# Patient Record
Sex: FEMALE | Race: WHITE | Employment: OTHER | URBAN - METROPOLITAN AREA
[De-identification: names, ages, dates, MRNs, and addresses within clinical notes are randomized per-mention and may not be internally consistent; named-entity substitution may affect disease eponyms.]

---

## 2019-09-05 ENCOUNTER — TRANSFERRED RECORDS (OUTPATIENT)
Dept: PHYSICAL THERAPY | Facility: CLINIC | Age: 44
End: 2019-09-05

## 2019-09-09 ENCOUNTER — THERAPY VISIT (OUTPATIENT)
Dept: PHYSICAL THERAPY | Facility: CLINIC | Age: 44
End: 2019-09-09
Payer: COMMERCIAL

## 2019-09-09 DIAGNOSIS — M25.552 BILATERAL HIP PAIN: ICD-10-CM

## 2019-09-09 DIAGNOSIS — M25.551 BILATERAL HIP PAIN: ICD-10-CM

## 2019-09-09 PROCEDURE — 97112 NEUROMUSCULAR REEDUCATION: CPT | Mod: GP | Performed by: PHYSICAL THERAPIST

## 2019-09-09 PROCEDURE — 97162 PT EVAL MOD COMPLEX 30 MIN: CPT | Mod: GP | Performed by: PHYSICAL THERAPIST

## 2019-09-09 PROCEDURE — 97110 THERAPEUTIC EXERCISES: CPT | Mod: GP | Performed by: PHYSICAL THERAPIST

## 2019-09-09 ASSESSMENT — ACTIVITIES OF DAILY LIVING (ADL)
TWISTING/PIVOTING_ON_INVOLVED_LEG: MODERATE DIFFICULTY
HOS_ADL_SCORE(%): 55
GETTING_INTO_AND_OUT_OF_A_BATHTUB: SLIGHT DIFFICULTY
RECREATIONAL_ACTIVITIES: MODERATE DIFFICULTY
HOS_ADL_COUNT: 15
SITTING_FOR_15_MINUTES: SLIGHT DIFFICULTY
DEEP_SQUATTING: UNABLE TO DO
WALKING_APPROXIMATELY_10_MINUTES: SLIGHT DIFFICULTY
STANDING_FOR_15_MINUTES: SLIGHT DIFFICULTY
GOING_DOWN_1_FLIGHT_OF_STAIRS: MODERATE DIFFICULTY
HOS_ADL_ITEM_SCORE_TOTAL: 33
LIGHT_TO_MODERATE_WORK: SLIGHT DIFFICULTY
HOS_ADL_HIGHEST_POTENTIAL_SCORE: 60
ROLLING_OVER_IN_BED: MODERATE DIFFICULTY
HEAVY_WORK: SLIGHT DIFFICULTY
WALKING_INITIALLY: EXTREME DIFFICULTY
STEPPING_UP_AND_DOWN_CURBS: MODERATE DIFFICULTY
GOING_UP_1_FLIGHT_OF_STAIRS: MODERATE DIFFICULTY
GETTING_INTO_AND_OUT_OF_AN_AVERAGE_CAR: MODERATE DIFFICULTY
WALKING_15_MINUTES_OR_GREATER: SLIGHT DIFFICULTY
HOW_WOULD_YOU_RATE_YOUR_CURRENT_LEVEL_OF_FUNCTION_DURING_YOUR_USUAL_ACTIVITIES_OF_DAILY_LIVING_FROM_0_TO_100_WITH_100_BEING_YOUR_LEVEL_OF_FUNCTION_PRIOR_TO_YOUR_HIP_PROBLEM_AND_0_BEING_THE_INABILITY_TO_PERFORM_ANY_OF_YOUR_USUAL_DAILY_ACTIVITIES?: 30

## 2019-09-09 NOTE — PROGRESS NOTES
[facility] Initial Evaluation  Subjective:    Melissa Go being seen for B hip pain.   Date of Onset: 9/2018. Problem occurred: Insidious onset R outer hip pain one month ago and L inside groin pain L at least one year ago.  and reported as 7/10 on pain scale. General health as reported by patient is fair. Pertinent medical history includes:  Anemia, asthma, depression and overweight (pre-diabetes, bipolar disorder, stress incontinence, pre menopausal, B thumb pain.  Has been seen in therapy 2016 for LB and leg pain (doesn't do much of HEP from this now), B shoulder pain).   Other medical allergies details: sulfa antibiotics, sensative to bandaids.   Other surgery history details: 2 section, collar bone.  Current medications:  Sleep medication, pain medication, anti-inflammatory and anti-depressants (anti-inflammatory for LBP but doesn't help hip pain).   Primary job tasks include:  Prolonged standing and driving.  Pain is described as aching and sharp and is intermittent. Pain is the same all the time. Since onset symptoms are gradually worsening (now R side worse too).      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 Restrictions include:  Working in normal job without restrictions.    Barriers include:  Stairs.  Red flags:  Pain at rest/night.  Type of problem:  Left hip    This is a chronic condition    Patient reports pain:  Groin (L groin, anterior hip, R lateral hip).  Associated symptoms:  Buckling/giving out, loss of motion/stiffness, loss of strength and catching. Symptoms are exacerbated by other, descending stairs, ascending stairs, bending/squatting and weight bearing (getting in/out of bed, getting in/out of car, stairs to get up to son's room sometimes one step at a time & railing, walking with toes pointed inward.  Bending to pick item from floor, crossing legs, rolling in bed, unable to have sex) Relieved by: lying down (any position)                      Objective:  Standing  Alignment:            Hip deviations alignment: shifts weight to R LE in standing and during sit to stand.        Gait:    Gait Type:  Antalgic   Weight Bearing Status:  WBAT   Assistive Devices:  None  Deviations:  General Deviations:  Toe out L               Lumbar/SI Evaluation  ROM:    AROM Lumbar:   Flexion:          No loss  Ext:                    Min loss reaches to toes stretching to B hips   Side Bend:        Left:     Right:   Rotation:           Left:     Right:   Side Glide:        Left:  No loss    Right:  No loss mild strain                                                              Hip Evaluation  HIP AROM:  : all R hip ROM in standing painful on L side due to weight bearing.  Flexion: Left: WNL    Right:  WNL     Extension: Left: tightness    Right:  Tightness  Abduction: Left:  WNL    Right:  WNL      Internal Rotation: Left: limited, painful   Right:  External Rotation: Left: 75    Right: 80        Hip Strength:    Flexion:   Left: 5/5   Pain:  Right: 4/5   Pain:                    Extension:  Left: 3/5  Pain:Right: 3/5    Pain:    Abduction:  Left: 4-/5     Pain:Right: 4/5    Pain:          Knee Extension:  Left: 5/5   Pain:Right: 5/5    Pain:        Hip Special Testing:   Special tests hip not assessed: (+) L hip scour, (-) R hip scour.  Left hip positive for the following special tests:  Shaheed; Fadir/Labrum and Russel  Left hip negative for the following special tests:  Piriformis; Distraction or SLR (Active SLR painful iniitally, passive SLR just stretching)  Right hip negative for the following special tests:  Piriformis; Shaheed; Fadir/Labrum; SLR or Russel    Hip Palpation:    Left hip tenderness present at:   hip flexors    Right hip tenderness not present at:  hip flexors  Functional Testing:            Proprioception:    Stork balance test:   Left:    3-4 seconds painful  Right:  3-4 seconds  % of Uninvolved:                  Mariola Lumbar Evaluation    Posture:  Sitting:  fair  Standing: fair    Lateral Shift: no  Correction of Posture: no effect  Other Observations: LBP with FIS and EIS no change to hip     Test Movements:  FIS: During: no effect  After: no effect  Pretest Movements: 0/10 pain  Repeat FIS: During: no effect  After: no effect    EIS: During: no effect  After: no effect    Repeat EIS: During: no effect  After: no effect  Mechanical Response: no effect            Conclusion: other (L hip potential derangement (YASIR?)  vs hip flexor strain)  Principle of Treatment:  Posture Correction: Educate keep neutral spine, (+) neural tension, use of lumbar support, slouch over-correct etc    Extension: will assess repeated hip extension next visit    Other: hip flexor stretching, hip pendulum prn for relief, progress with gluteal strengthening, potential joint mobilizations                                       ROS    Assessment/Plan:    Patient is a 43 year old female with B hip complaints.    Patient has the following significant findings with corresponding treatment plan.                Diagnosis 1:  B hip pain    Pain -  hot/cold therapy, manual therapy, self management, education and home program  Decreased ROM/flexibility - manual therapy, therapeutic exercise and home program  Decreased joint mobility - manual therapy, therapeutic exercise and home program  Decreased strength - therapeutic exercise, therapeutic activities and home program  Decreased proprioception - neuro re-education, therapeutic activities and home program  Impaired gait - gait training and home program  Decreased function - therapeutic activities and home program  Impaired posture - neuro re-education and home program    Therapy Evaluation Codes:   1) History comprised of:   Personal factors that impact the plan of care:      Overall behavior pattern and Past/current experiences.    Comorbidity factors that impact the plan of care are:      Mental illness, Pain at night/rest and Weakness.     Medications  impacting care: Anti-inflammatory and Pain.  2) Examination of Body Systems comprised of:   Body structures and functions that impact the plan of care:      Hip and Lumbar spine.   Activity limitations that impact the plan of care are:      Bending, Dressing, Lifting, Sitting, Squatting/kneeling, Stairs, Standing, Walking, Working and [place].  3) Clinical presentation characteristics are:   Evolving/Changing.  4) Decision-Making    Moderate complexity using standardized patient assessment instrument and/or measureable assessment of functional outcome.  Cumulative Therapy Evaluation is: Moderate complexity.    Previous and current functional limitations:  (See Goal Flow Sheet for this information)    Short term and Long term goals: (See Goal Flow Sheet for this information)     Communication ability:  Patient appears to be able to clearly communicate and understand verbal and written communication and follow directions correctly.  Treatment Explanation - The following has been discussed with the patient:   RX ordered/plan of care  Anticipated outcomes  Possible risks and side effects  This patient would benefit from PT intervention to resume normal activities.   Rehab potential is good.    Frequency:  1 X week, once daily  Duration:  for 8 weeks  Discharge Plan:  Achieve all LTG.  Independent in home treatment program.  Reach maximal therapeutic benefit.    Please refer to the daily flowsheet for treatment today, total treatment time and time spent performing 1:1 timed codes.

## 2019-09-09 NOTE — LETTER
[facility] PHYSICAL THERAPY  [address]  [place] [address]  [phone]    September 10, 2019    Re: Melissa Go   :   1975  MRN:  [id number]   REFERRING PHYSICIAN:   Quyen Cleveland    [facility]    Date of Initial Evaluation:  2019  Visits:  Rxs Used: 1  Reason for Referral:  Bilateral hip pain    EVALUATION SUMMARY    Subjective:  Melissa Go being seen for B hip pain.   Date of Onset: 2018. Problem occurred: Insidious onset R outer hip pain one month ago and L inside groin pain L at least one year ago.  and reported as 7/10 on pain scale. General health as reported by patient is fair. Pertinent medical history includes:  Anemia, asthma, depression and overweight (pre-diabetes, bipolar disorder, stress incontinence, pre menopausal, B thumb pain.  Has been seen in therapy 2016 for LB and leg pain (doesn't do much of HEP from this now), B shoulder pain).   Other medical allergies details: sulfa antibiotics, sensative to bandaids.   Other surgery history details: 2 section, collar bone.  Current medications:  Sleep medication, pain medication, anti-inflammatory and anti-depressants (anti-inflammatory for LBP but doesn't help hip pain).   Primary job tasks include:  Prolonged standing and driving.  Pain is described as aching and sharp and is intermittent. Pain is the same all the time. Since onset symptoms are gradually worsening (now R side worse too).      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 Restrictions include:  Working in normal job without restrictions.  Barriers include:  Stairs.  Red flags:  Pain at rest/night.  Type of problem:  Left hip  This is a chronic condition    Patient reports pain:  Groin (L groin, anterior hip, R lateral hip).  Associated symptoms:  Buckling/giving out, loss of motion/stiffness, loss of strength and catching. Symptoms are exacerbated by  other, descending stairs, ascending stairs, bending/squatting and weight bearing (getting in/out of bed, getting in/out of car, stairs to get up to son's room sometimes one step at a time & railing, walking with toes pointed inward.  Bending to pick item from floor, crossing legs, rolling in bed, unable to have sex) Relieved by: lying down (any position)                  Objective:  Standing Alignment:    Hip deviations alignment: shifts weight to R LE in standing and during sit to stand.  Gait:    Gait Type:  Antalgic   Weight Bearing Status:  WBAT   Assistive Devices:  None  Deviations:  General Deviations:  Toe out L    Re: Melissa Donavan   :   1975       Lumbar/SI Evaluation  ROM:    AROM Lumbar:   Flexion:          No loss  Ext:                    Min loss reaches to toes stretching to B hips   Side Bend:        Left:     Right:   Rotation:           Left:     Right:   Side Glide:        Left:  No loss    Right:  No loss mild strain           Hip Evaluation  HIP AROM:  : all R hip ROM in standing painful on L side due to weight bearing.  Flexion: Left: WNL    Right:  WNL   Extension: Left: tightness    Right:  Tightness  Abduction: Left:  WNL    Right:  WNL  Internal Rotation: Left: limited, painful   Right:  External Rotation: Left: 75    Right: 80   Hip Strength:    Flexion:   Left: 5/5   Pain:  Right: 4/5   Pain:                  Extension:  Left: 3/5  Pain:Right: 3/5    Pain:    Abduction:  Left: 4-/5     Pain:Right: 4/5    Pain:  Knee Extension:  Left: 5/5   Pain:Right: 5/5    Pain:    Hip Special Testing:   Special tests hip not assessed: (+) L hip scour, (-) R hip scour.  Left hip positive for the following special tests:  Shaheed; Fadir/Labrum and Russel  Left hip negative for the following special tests:  Piriformis; Distraction or SLR (Active SLR painful iniitally, passive SLR just stretching)  Right hip negative for the following special tests:  Piriformis; Shaheed; Fadir/Labrum; SLR or Russel     Hip Palpation:    Left hip tenderness present at:   hip flexors  Right hip tenderness not present at:  hip flexors  Functional Testing:    Proprioception:  Stork balance test:   Left:    3-4 seconds painful  Right:  3-4 seconds  % of Uninvolved:     Mariola Lumbar Evaluation  Posture:  Sitting: fair  Standing: fair  Lateral Shift: no  Correction of Posture: no effect  Other Observations: LBP with FIS and EIS no change to hip   Test Movements:  FIS: During: no effect  After: no effect  Pretest Movements: 0/10 pain  Repeat FIS: During: no effect  After: no effect    EIS: During: no effect  After: no effect    Repeat EIS: During: no effect  After: no effect  Mechanical Response: no effect  Conclusion: other (L hip potential derangement (YASIR?)  vs hip flexor strain)  Principle of Treatment:  Posture Correction: Educate keep neutral spine, (+) neural tension, use of lumbar support, slouch over-correct etc  Extension: will assess repeated hip extension next visit  Other: hip flexor stretching, hip pendulum prn for relief, progress with gluteal strengthening, potential joint mobilizations     Assessment/Plan:    Patient is a 43 year old female with B hip complaints.    Patient has the following significant findings with corresponding treatment plan.                Diagnosis 1:  B hip pain  Pain -  hot/cold therapy, manual therapy, self management, education and home program  Decreased ROM/flexibility - manual therapy, therapeutic exercise and home program  Decreased joint mobility - manual therapy, therapeutic exercise and home program  Decreased strength - therapeutic exercise, therapeutic activities and home program  Decreased proprioception - neuro re-education, therapeutic activities and home program  Impaired gait - gait training and home program  Decreased function - therapeutic activities and home program  Impaired posture - neuro re-education and home program    Therapy Evaluation Codes:   1) History comprised  of:   Personal factors that impact the plan of care:      Overall behavior pattern and Past/current experiences.    Comorbidity factors that impact the plan of care are:      Mental illness, Pain at night/rest and Weakness.     Medications impacting care: Anti-inflammatory and Pain.  2) Examination of Body Systems comprised of:   Body structures and functions that impact the plan of care:      Hip and Lumbar spine.   Activity limitations that impact the plan of care are:      Bending, Dressing, Lifting, Sitting, Squatting/kneeling, Stairs, Standing, Walking, Working and [place].  3) Clinical presentation characteristics are:   Evolving/Changing.  4) Decision-Making    Moderate complexity using standardized patient assessment instrument and/or measureable assessment of functional outcome.  Cumulative Therapy Evaluation is: Moderate complexity.    Previous and current functional limitations:  (See Goal Flow Sheet for this information)    Short term and Long term goals: (See Goal Flow Sheet for this information)     Communication ability:  Patient appears to be able to clearly communicate and understand verbal and written communication and follow directions correctly.  Treatment Explanation - The following has been discussed with the patient:   RX ordered/plan of care  Anticipated outcomes  Possible risks and side effects  This patient would benefit from PT intervention to resume normal activities.   Rehab potential is good.    Frequency:  1 X week, once daily  Duration:  for 8 weeks  Re: Melissa Go   :   1975    Discharge Plan:  Achieve all LTG.  Independent in home treatment program.  Reach maximal therapeutic benefit.    Thank you for your referral.    INQUIRIES  Therapist: Regina Montalvo DPT  [facility] PH[facility]ICAL [facility]ERAPY  [address] [address]  Phone: [phone]  Fax: [phone]

## 2019-09-16 ENCOUNTER — THERAPY VISIT (OUTPATIENT)
Dept: PHYSICAL THERAPY | Facility: CLINIC | Age: 44
End: 2019-09-16
Payer: COMMERCIAL

## 2019-09-16 DIAGNOSIS — M25.552 BILATERAL HIP PAIN: ICD-10-CM

## 2019-09-16 DIAGNOSIS — M25.551 BILATERAL HIP PAIN: ICD-10-CM

## 2019-09-16 PROCEDURE — 97110 THERAPEUTIC EXERCISES: CPT | Mod: GP | Performed by: PHYSICAL THERAPIST

## 2019-09-16 PROCEDURE — 97140 MANUAL THERAPY 1/> REGIONS: CPT | Mod: GP | Performed by: PHYSICAL THERAPIST

## 2019-09-23 ENCOUNTER — THERAPY VISIT (OUTPATIENT)
Dept: PHYSICAL THERAPY | Facility: CLINIC | Age: 44
End: 2019-09-23
Payer: COMMERCIAL

## 2019-09-23 DIAGNOSIS — M25.551 BILATERAL HIP PAIN: ICD-10-CM

## 2019-09-23 DIAGNOSIS — M25.552 BILATERAL HIP PAIN: ICD-10-CM

## 2019-09-23 PROCEDURE — 97110 THERAPEUTIC EXERCISES: CPT | Mod: GP | Performed by: PHYSICAL THERAPIST

## 2019-09-23 PROCEDURE — 97140 MANUAL THERAPY 1/> REGIONS: CPT | Mod: GP | Performed by: PHYSICAL THERAPIST

## 2019-09-30 ENCOUNTER — THERAPY VISIT (OUTPATIENT)
Dept: PHYSICAL THERAPY | Facility: CLINIC | Age: 44
End: 2019-09-30
Payer: COMMERCIAL

## 2019-09-30 DIAGNOSIS — M25.551 BILATERAL HIP PAIN: ICD-10-CM

## 2019-09-30 DIAGNOSIS — M25.552 BILATERAL HIP PAIN: ICD-10-CM

## 2019-09-30 PROCEDURE — 97110 THERAPEUTIC EXERCISES: CPT | Mod: GP | Performed by: PHYSICAL THERAPIST

## 2019-09-30 PROCEDURE — 97140 MANUAL THERAPY 1/> REGIONS: CPT | Mod: GP | Performed by: PHYSICAL THERAPIST

## 2019-10-14 ENCOUNTER — THERAPY VISIT (OUTPATIENT)
Dept: PHYSICAL THERAPY | Facility: CLINIC | Age: 44
End: 2019-10-14
Payer: COMMERCIAL

## 2019-10-14 DIAGNOSIS — M25.551 BILATERAL HIP PAIN: ICD-10-CM

## 2019-10-14 DIAGNOSIS — M25.552 BILATERAL HIP PAIN: ICD-10-CM

## 2019-10-14 PROCEDURE — 97110 THERAPEUTIC EXERCISES: CPT | Mod: GP | Performed by: PHYSICAL THERAPIST

## 2019-10-14 PROCEDURE — 97140 MANUAL THERAPY 1/> REGIONS: CPT | Mod: GP | Performed by: PHYSICAL THERAPIST

## 2019-10-28 ENCOUNTER — THERAPY VISIT (OUTPATIENT)
Dept: PHYSICAL THERAPY | Facility: CLINIC | Age: 44
End: 2019-10-28
Payer: COMMERCIAL

## 2019-10-28 DIAGNOSIS — M25.552 BILATERAL HIP PAIN: ICD-10-CM

## 2019-10-28 DIAGNOSIS — M25.551 BILATERAL HIP PAIN: ICD-10-CM

## 2019-10-28 PROCEDURE — 97110 THERAPEUTIC EXERCISES: CPT | Mod: GP | Performed by: PHYSICAL THERAPIST

## 2019-10-28 PROCEDURE — 97112 NEUROMUSCULAR REEDUCATION: CPT | Mod: GP | Performed by: PHYSICAL THERAPIST

## 2019-11-11 ENCOUNTER — THERAPY VISIT (OUTPATIENT)
Dept: PHYSICAL THERAPY | Facility: CLINIC | Age: 44
End: 2019-11-11
Payer: COMMERCIAL

## 2019-11-11 DIAGNOSIS — M25.552 BILATERAL HIP PAIN: ICD-10-CM

## 2019-11-11 DIAGNOSIS — M25.551 BILATERAL HIP PAIN: ICD-10-CM

## 2019-11-11 PROCEDURE — 97110 THERAPEUTIC EXERCISES: CPT | Mod: GP | Performed by: PHYSICAL THERAPIST

## 2019-11-11 PROCEDURE — 97112 NEUROMUSCULAR REEDUCATION: CPT | Mod: GP | Performed by: PHYSICAL THERAPIST

## 2019-11-11 ASSESSMENT — ACTIVITIES OF DAILY LIVING (ADL)
LIGHT_TO_MODERATE_WORK: MODERATE DIFFICULTY
GOING_UP_1_FLIGHT_OF_STAIRS: MODERATE DIFFICULTY
GETTING_INTO_AND_OUT_OF_AN_AVERAGE_CAR: MODERATE DIFFICULTY
STEPPING_UP_AND_DOWN_CURBS: MODERATE DIFFICULTY
HOW_WOULD_YOU_RATE_YOUR_CURRENT_LEVEL_OF_FUNCTION_DURING_YOUR_USUAL_ACTIVITIES_OF_DAILY_LIVING_FROM_0_TO_100_WITH_100_BEING_YOUR_LEVEL_OF_FUNCTION_PRIOR_TO_YOUR_HIP_PROBLEM_AND_0_BEING_THE_INABILITY_TO_PERFORM_ANY_OF_YOUR_USUAL_DAILY_ACTIVITIES?: 40
GOING_DOWN_1_FLIGHT_OF_STAIRS: MODERATE DIFFICULTY
GETTING_INTO_AND_OUT_OF_A_BATHTUB: SLIGHT DIFFICULTY
RECREATIONAL_ACTIVITIES: MODERATE DIFFICULTY
HOS_ADL_HIGHEST_POTENTIAL_SCORE: 52
ROLLING_OVER_IN_BED: MODERATE DIFFICULTY
STANDING_FOR_15_MINUTES: SLIGHT DIFFICULTY
WALKING_APPROXIMATELY_10_MINUTES: MODERATE DIFFICULTY
HOS_ADL_ITEM_SCORE_TOTAL: 28
WALKING_15_MINUTES_OR_GREATER: MODERATE DIFFICULTY
PUTTING_ON_SOCKS_AND_SHOES: MODERATE DIFFICULTY
TWISTING/PIVOTING_ON_INVOLVED_LEG: SLIGHT DIFFICULTY
HOS_ADL_COUNT: 13
SITTING_FOR_15_MINUTES: SLIGHT DIFFICULTY
WALKING_INITIALLY: EXTREME DIFFICULTY

## 2019-11-11 NOTE — LETTER
[facility] ATHLETIC [facility] PHYSICAL THERAPY  [address]  [place] [address]  [phone]    2019    Re: Melissa Go   :   1975  MRN:  [id number]   REFERRING PHYSICIAN:   Quyen Cleveland    [facility]    Date of Initial Evaluation:  2019  Visits:  Rxs Used: 7  Reason for Referral:  Bilateral hip pain    PROGRESS  REPORT  Progress reporting period is from 19 to 19.       SUBJECTIVE  Subjective changes noted by patient: L hip still painful in groin region- gets pinches, but is overall better and feels pretty good today.  Also having LBP.  Able to take stairs better and rolling in bed is better but does have some pain.  Picking items up from floor difficult.  Strengthening exercises going ok.    Current Pain level: 0/10.     Initial Pain level: 7/10.   Changes in function:  Yes (See Goal flowsheet attached for changes in current functional level)  Adverse reaction to treatment or activity: None    OBJECTIVE:  Changes noted in objective findings:  Yes:  AAROM hip ER L 78 + strain, R 90.    MMT hip flexion B 5/5, hip ext improved from B 3/5 to 4-/5 B, hip ABD L 4-/5, R 4/5.  SLS improved from 3-4 sec to L 8-10 sec, R 5-6 sec, tendency for hip drop B.    (+) L hip scour, SHIRLEY, FADIR.    Strength and endurance and exercise tolerance and flexibility are improving.     ASSESSMENT/PLAN  Updated problem list and treatment plan: Diagnosis 1:  B hip pain  Pain -  manual therapy, self management, education and home program  Decreased ROM/flexibility - manual therapy, therapeutic exercise and home program  Decreased joint mobility - manual therapy, therapeutic exercise and home program  Decreased strength - therapeutic exercise, therapeutic activities and home program  Decreased proprioception - neuro re-education, therapeutic activities and home program  Impaired gait - gait training  and home program  Decreased function - therapeutic activities and home program  STG/LTGs have been met or progress has been made towards goals:  Yes (See Goal flow sheet completed today.)  Assessment of Progress: The patient's condition is improving.  Self Management Plans:  Patient has been instructed in a home treatment program.  Re: Melissa Go   :   1975    Patient  has been instructed in self management of symptoms.  I have re-evaluated this patient and find that the nature, scope, duration and intensity of the therapy is appropriate for the medical condition of the patient.  Melissa continues to require the following intervention to meet STG and LTG's:  PT    Recommendations:  This patient would benefit from continued therapy.     Frequency:  1 X week, once daily  Duration:  for 5 weeks    Thank you for your referral.    INQUIRIES  Therapist: Regina Montalvo DPT  [facility]  [address] [address]  Phone: [phone]  Fax: [phone]

## 2019-11-12 NOTE — PROGRESS NOTES
Subjective:  HPI                    Objective:  System    Physical Exam    General     ROS    Assessment/Plan:    PROGRESS  REPORT    Progress reporting period is from 9/9/19 to 11/11/19.       SUBJECTIVE  Subjective changes noted by patient: L hip still painful in groin region- gets pinches, but is overall better and feels pretty good today.  Also having LBP.  Able to take stairs better and rolling in bed is better but does have some pain.  Picking items up from floor difficult.  Strengthening exercises going ok.    Current Pain level: 0/10.     Initial Pain level: 7/10.   Changes in function:  Yes (See Goal flowsheet attached for changes in current functional level)  Adverse reaction to treatment or activity: None    OBJECTIVE:  Changes noted in objective findings:  Yes:  AAROM hip ER L 78 + strain, R 90.    MMT hip flexion B 5/5, hip ext improved from B 3/5 to 4-/5 B, hip ABD L 4-/5, R 4/5.  SLS improved from 3-4 sec to L 8-10 sec, R 5-6 sec, tendency for hip drop B.    (+) L hip scour, SHIRLEY, FADIR.    Strength and endurance and exercise tolerance and flexibility are improving.     ASSESSMENT/PLAN  Updated problem list and treatment plan: Diagnosis 1:  B hip pain    Pain -  manual therapy, self management, education and home program  Decreased ROM/flexibility - manual therapy, therapeutic exercise and home program  Decreased joint mobility - manual therapy, therapeutic exercise and home program  Decreased strength - therapeutic exercise, therapeutic activities and home program  Decreased proprioception - neuro re-education, therapeutic activities and home program  Impaired gait - gait training and home program  Decreased function - therapeutic activities and home program  STG/LTGs have been met or progress has been made towards goals:  Yes (See Goal flow sheet completed today.)  Assessment of Progress: The patient's condition is improving.  Self Management Plans:  Patient has been instructed in a home treatment  program.  Patient  has been instructed in self management of symptoms.  I have re-evaluated this patient and find that the nature, scope, duration and intensity of the therapy is appropriate for the medical condition of the patient.  Melissa continues to require the following intervention to meet STG and LTG's:  PT    Recommendations:  This patient would benefit from continued therapy.     Frequency:  1 X week, once daily  Duration:  for 5 weeks        Please refer to the daily flowsheet for treatment today, total treatment time and time spent performing 1:1 timed codes.

## 2020-03-16 PROBLEM — M25.551 BILATERAL HIP PAIN: Status: RESOLVED | Noted: 2019-09-09 | Resolved: 2020-03-16

## 2020-03-16 PROBLEM — M25.552 BILATERAL HIP PAIN: Status: RESOLVED | Noted: 2019-09-09 | Resolved: 2020-03-16
